# Patient Record
Sex: FEMALE | URBAN - METROPOLITAN AREA
[De-identification: names, ages, dates, MRNs, and addresses within clinical notes are randomized per-mention and may not be internally consistent; named-entity substitution may affect disease eponyms.]

---

## 2021-05-25 ENCOUNTER — APPOINTMENT (RX ONLY)
Dept: URBAN - METROPOLITAN AREA CLINIC 61 | Facility: CLINIC | Age: 46
Setting detail: DERMATOLOGY
End: 2021-05-25

## 2021-05-25 DIAGNOSIS — D17 BENIGN LIPOMATOUS NEOPLASM: ICD-10-CM

## 2021-05-25 PROBLEM — D48.5 NEOPLASM OF UNCERTAIN BEHAVIOR OF SKIN: Status: ACTIVE | Noted: 2021-05-25

## 2021-05-25 PROCEDURE — 99202 OFFICE O/P NEW SF 15 MIN: CPT

## 2021-05-25 PROCEDURE — ? TREATMENT REGIMEN

## 2021-05-25 PROCEDURE — ? COUNSELING

## 2021-05-25 PROCEDURE — ? DEFER

## 2021-05-25 ASSESSMENT — LOCATION DETAILED DESCRIPTION DERM: LOCATION DETAILED: RIGHT PROXIMAL RADIAL DORSAL FOREARM

## 2021-05-25 ASSESSMENT — LOCATION ZONE DERM: LOCATION ZONE: ARM

## 2021-05-25 ASSESSMENT — LOCATION SIMPLE DESCRIPTION DERM: LOCATION SIMPLE: RIGHT FOREARM

## 2021-05-25 NOTE — PROCEDURE: TREATMENT REGIMEN
Plan: Pt will be referred to [facility] general surgery since the spot is located on the muscle.
Otc Regimen: Voltaren gel  once to twice a day on right forearm
Detail Level: Zone

## 2021-05-25 NOTE — PROCEDURE: DEFER
Reason To Defer Override: Refer to general surgery [facility]
Detail Level: Detailed
Introduction Text (Please End With A Colon): The following procedure was deferred:advised patient benign, but is irritated by her pant line: